# Patient Record
Sex: MALE | Race: ASIAN | NOT HISPANIC OR LATINO
[De-identification: names, ages, dates, MRNs, and addresses within clinical notes are randomized per-mention and may not be internally consistent; named-entity substitution may affect disease eponyms.]

---

## 2019-02-18 ENCOUNTER — OTHER (OUTPATIENT)
Age: 74
End: 2019-02-18

## 2019-02-18 ENCOUNTER — EMERGENCY (EMERGENCY)
Facility: HOSPITAL | Age: 74
LOS: 1 days | Discharge: HOME | End: 2019-02-18
Attending: EMERGENCY MEDICINE
Payer: MEDICARE

## 2019-02-18 VITALS
OXYGEN SATURATION: 99 % | HEART RATE: 74 BPM | TEMPERATURE: 98 F | RESPIRATION RATE: 18 BRPM | SYSTOLIC BLOOD PRESSURE: 128 MMHG | DIASTOLIC BLOOD PRESSURE: 71 MMHG

## 2019-02-18 VITALS
DIASTOLIC BLOOD PRESSURE: 79 MMHG | HEART RATE: 78 BPM | RESPIRATION RATE: 18 BRPM | WEIGHT: 160.06 LBS | SYSTOLIC BLOOD PRESSURE: 135 MMHG | TEMPERATURE: 98 F | OXYGEN SATURATION: 95 % | HEIGHT: 68 IN

## 2019-02-18 PROCEDURE — 72125 CT NECK SPINE W/O DYE: CPT | Mod: 26

## 2019-02-18 PROCEDURE — 70486 CT MAXILLOFACIAL W/O DYE: CPT

## 2019-02-18 PROCEDURE — 76377 3D RENDER W/INTRP POSTPROCES: CPT

## 2019-02-18 PROCEDURE — 90715 TDAP VACCINE 7 YRS/> IM: CPT

## 2019-02-18 PROCEDURE — 99284 EMERGENCY DEPT VISIT MOD MDM: CPT | Mod: 25

## 2019-02-18 PROCEDURE — 72125 CT NECK SPINE W/O DYE: CPT

## 2019-02-18 PROCEDURE — 70450 CT HEAD/BRAIN W/O DYE: CPT | Mod: 26

## 2019-02-18 PROCEDURE — 76377 3D RENDER W/INTRP POSTPROCES: CPT | Mod: 26

## 2019-02-18 PROCEDURE — 93010 ELECTROCARDIOGRAM REPORT: CPT

## 2019-02-18 PROCEDURE — 70486 CT MAXILLOFACIAL W/O DYE: CPT | Mod: 26

## 2019-02-18 PROCEDURE — 93005 ELECTROCARDIOGRAM TRACING: CPT

## 2019-02-18 PROCEDURE — 70450 CT HEAD/BRAIN W/O DYE: CPT

## 2019-02-18 RX ORDER — TETANUS TOXOID, REDUCED DIPHTHERIA TOXOID AND ACELLULAR PERTUSSIS VACCINE, ADSORBED 5; 2.5; 8; 8; 2.5 [IU]/.5ML; [IU]/.5ML; UG/.5ML; UG/.5ML; UG/.5ML
0.5 SUSPENSION INTRAMUSCULAR ONCE
Qty: 0 | Refills: 0 | Status: COMPLETED | OUTPATIENT
Start: 2019-02-18 | End: 2019-02-18

## 2019-02-18 RX ADMIN — TETANUS TOXOID, REDUCED DIPHTHERIA TOXOID AND ACELLULAR PERTUSSIS VACCINE, ADSORBED 0.5 MILLILITER(S): 5; 2.5; 8; 8; 2.5 SUSPENSION INTRAMUSCULAR at 21:24

## 2019-02-18 NOTE — ED PROVIDER NOTE - NSFOLLOWUPINSTRUCTIONS_ED_ALL_ED_FT
1. Take tylenol as needed for pain   2. Apply ice to your eye to decrease swelling   3. Follow-up with ophthalmology this week for reevaluation and continued treatment   4. REturn to the ER for any new or worsening symptoms

## 2019-02-18 NOTE — ED PROVIDER NOTE - NSFOLLOWUPCLINICS_GEN_ALL_ED_FT
[facility] - Ophthalmology  Ophthalmology  [address] [facility] [facility], [facility] [address]  [place] [address]  Phone: [phone]  Fax:   Follow Up Time:

## 2019-02-18 NOTE — ED PROVIDER NOTE - PROGRESS NOTE DETAILS
ophthalmology sees and evaluates patient stating that no entrapment is found, patient's eye is OK and retrobulbar is nonconcerning. no further treatment needed at this time. recommending ophthalmology follow-up this week for reevaluation. -Aure Ruffin PA-C

## 2019-02-18 NOTE — ED ADULT TRIAGE NOTE - CHIEF COMPLAINT QUOTE
vasovagal  + Fall + Loc + Head injury   facial lacerations are sutured by his friend plastic surgery  Pt came for further eval C/O head neck pain + Right eye pain  + nausea + dizziness

## 2019-02-18 NOTE — ED PROVIDER NOTE - OBJECTIVE STATEMENT
73yom NO pmhx here with facial trauma and head injury. Pt reports around 10am straining to have BM and after standing up quickly, fell and +LOC. pt was then seen by plastic surgeon (friend) who sutured the lacerations on the face then spoke to ophtho friend who was concerned with pain to ROM so sent to the ER. no vision changes. no headache. improving nausea since the fall. No vomiting. No chest or abd pain. 73yom NO pmhx here with facial trauma and head injury. Pt reports around 10am straining to have BM and after standing up quickly, fell and +LOC. pt was then seen by plastic surgeon (friend) who sutured the lacerations on the face then spoke to opho friend who was concerned with pain to ROM so sent to the ER. no vision changes. no headache. improving nausea since the fall. No vomiting. No chest or abd pain.    Attendinyo male presents with face trauma s/p fall this morning.  no LOC.  pt had lacerations repaired by a friend of his who is a plastic surgeon.

## 2019-02-18 NOTE — ED ADULT NURSE NOTE - ED STAT RN HANDOFF DETAILS
report received from Bernadette WEISS. pt currently in x ray, resting in bed comfortably report received from Bernadette WEISS. pt currently in CT, resting in bed comfortably

## 2019-02-18 NOTE — ED ADULT NURSE NOTE - OBJECTIVE STATEMENT
73 yr old male top ED c/o s/p syncope in THE BR , while having a BM. lac to rt side face near eye. Saw plastic surgeon friend, had about 20 stitches Steri strips on. Pt awake alert and orientedx3 Resp even and nonlab . Denies chest pain or sob. C/o feeling nausea. C/o rt eyeball hurting. Denies change in vision No facial droop or slurred speech Moving all four extremities. Responds approp to verbal and tactile stimuli. Denies abd pain Wife at bedside

## 2019-02-18 NOTE — CONSULT NOTE ADULT - SUBJECTIVE AND OBJECTIVE BOX
[facility] Ophthalmology Consult Note    HPI: 73yom NO pmhx here with facial trauma and head injury. Pt reports around 10am straining to have BM and after standing up quickly, fell and +LOC. pt was then seen by plastic surgeon (friend) who sutured the lacerations on the face then spoke to ophtho friend who was concerned with pain to EOM so sent to the ER. no vision changes/flashers, floaters. R inf orbital wall fx on CT. no headache. improving nausea since the fall. No vomiting. No chest or abd pain.    PMH: None  Meds: None  POcHx (including surgeries/lasers/trauma):  None  Drops: None  FamHx: None  Social Hx: None  Allergies: NKDA    ROS:  General (neg), Vision (per HPI), Head and Neck (neg), Pulm (neg), CV (neg), GI (neg),  (neg), Musculoskeletal (neg), Skin/Integ (neg), Neuro (neg), Endocrine (neg), Heme (neg), All/Immuno (neg)    Mood and Affect Appropriate ( x ),  Oriented to Time, Place, and Person x 3 ( x )    Ophthalmology Exam    Visual acuity (sc): 20/40 OU  Pupils: PERRL OU, no APD  Ttono: 15 OU  Extraocular movements (EOMs): Full OU, pain w EOM in all gazes, no diplopia   Confrontational Visual Field (CVF):  Full OU  Color Plates: 12/12 OU    Pen Light Exam (PLE)  External:  Repaired per-orbital lacerations OD  Lids/Lashes/Lacrimal Ducts: Flat OU    Sclera/Conjunctiva:  W+Q OU  Cornea: Cl OU  Anterior Chamber: D+F OU  Iris:  Flat OU  Lens:  Cl OU    Fundus Exam: dilated with 1% tropicamide and 2.5% phenylephrine  Approval obtained from primary team for dilation  Patient aware that pupils can remained dilated for at least 4-6 hours  Exam performed with 20D lens    Vitreous: wnl OU  Disc, cup/disc: sharp and pink, 0.4 OU  Macula:  wnl OU  Vessels:  wnl OU  Periphery: wnl OU    Diagnostic Testing:  < from: CT Maxillofacial No Cont (02.18.19 @ 20:11) >  FINDINGS:     CT BRAIN:     There is no acute intra-axial or extra-axial hemorrhage. There is no mass   effect or shift of the midline. The basal cisterns are not effaced. There   is mild cerebral volume loss with commensurate prominence of the   ventricles and sulci. Minimal chronic ischemic changes are seen in the   frontoparietal white matter. There is no CT evidence of an acute vascular  territorial infarct. There are atherosclerotic calcifications of the   intracranial carotid arteries.    There is no significant scalp soft tissue swelling or scalp hematoma. The   skull base and bony calvarium are intact. The tympanic and mastoid   cavities are well aerated.    CT CERVICAL SPINE:     There is mild nonspecific straightening of the cervical spine lordosis.   There is no acute cervical spine fracture or evidence of traumatic   malalignment. There is no significant prevertebral soft tissue   swelling/hematoma. There is minimal multilevel intervertebral disc space   narrowing, small disc bulges, and mild facet and uncinate hypertrophy.   There is no high-grade spinal canal stenosis. There is multilevel varying   degrees of neural foraminal stenosis. The regional soft tissues of the   neck are otherwise grossly unremarkable. The lung apices are clear.    CT FACE    There is no significant facial soft tissue swelling/hematoma. There is a   posterior comminuted right inferior orbital wall fracture with depression   of fracture fragments and large amount of extraconal fat herniating   through the fracture defect. The inferior rectus muscle remains above the   fracture defect. The orbital rim is intact. No other acute maxillofacial   bone fracture is identified. The mandible is intact. The   temporomandibular joints are not dislocated. Incidental note is made of   torus mandibularis. There is a trace hemorrhagic fluid level within the   right maxillary sinus. There is mild bilateral frontoethmoidal recess and   ethmoid mucosal thickening. The optic globes are smooth and symmetric.   There is trace infiltration of the retrobulbar fat on the right likely   representing trace retrobulbar hematoma.      IMPRESSION:     CT BRAIN: No acute intercranial hemorrhage, mass effect, or acute osseous   fracture.    CT CERVICAL SPINE: No acute cervical spine fracture or evidence of   traumatic malalignment. Mild cervical spondylosis.    CT FACE: Posterior comminuted right inferior orbital wall fracture with   depression of fracture fragments and large amount of herniated extraconal   fat through the fracture defect. Inferior rectus muscle remains above the   fracture defect. Small amount of retrobulbar hematoma.    < end of copied text >        Assessment/Plan:  73yom NO pmhx here with facial trauma and head injury. Pt reports around 10am straining to have BM and after standing up quickly, fell and +LOC. pt was then seen by plastic surgeon (friend) who sutured the lacerations on the face then spoke to ophtho friend who was concerned with pain to EOM so sent to the ER. no vision changes/flashers, floaters. no headache. improving nausea since the fall. No vomiting. No chest or abd pain.  - Visual acuity stable   - IOP wnl OU  - EOM full, no diplopia, pain on EOM all gazes  - No radiologic or clinical signs of entrapment  - No enophthalmos   - Globes intact OU  - Avoid nose blowing  - ice packs q1-2 hours for 20 min each time  - abx per primary  - Patient should follow up his/her ophthalmologist or in the [facility] within 1 week of discharge.  [address] [place] [address] [phone]

## 2019-02-18 NOTE — ED PROVIDER NOTE - CARE PROVIDER_API CALL
Bradley Lora)  Ophthalmology  [address] [place] [address], [facility] [address]  [place] [address]  Phone: [phone]  Fax: [phone]  Follow Up Time:

## 2019-03-01 ENCOUNTER — TRANSCRIPTION ENCOUNTER (OUTPATIENT)
Age: 74
End: 2019-03-01

## 2019-03-01 ENCOUNTER — APPOINTMENT (OUTPATIENT)
Dept: OTOLARYNGOLOGY | Facility: CLINIC | Age: 74
End: 2019-03-01
Payer: MEDICARE

## 2019-03-01 ENCOUNTER — APPOINTMENT (OUTPATIENT)
Dept: CARDIOLOGY | Facility: CLINIC | Age: 74
End: 2019-03-01
Payer: MEDICARE

## 2019-03-01 ENCOUNTER — NON-APPOINTMENT (OUTPATIENT)
Age: 74
End: 2019-03-01

## 2019-03-01 VITALS
HEART RATE: 78 BPM | TEMPERATURE: 98.5 F | HEIGHT: 68.11 IN | BODY MASS INDEX: 24.25 KG/M2 | DIASTOLIC BLOOD PRESSURE: 74 MMHG | OXYGEN SATURATION: 96 % | WEIGHT: 160 LBS | SYSTOLIC BLOOD PRESSURE: 138 MMHG | RESPIRATION RATE: 17 BRPM

## 2019-03-01 PROCEDURE — 99204 OFFICE O/P NEW MOD 45 MIN: CPT

## 2019-03-01 PROCEDURE — 93306 TTE W/DOPPLER COMPLETE: CPT

## 2019-03-01 PROCEDURE — 92524 BEHAVRAL QUALIT ANALYS VOICE: CPT | Mod: GN

## 2019-03-01 PROCEDURE — 31579 LARYNGOSCOPY TELESCOPIC: CPT

## 2019-03-01 PROCEDURE — 93224 XTRNL ECG REC UP TO 48 HRS: CPT

## 2019-03-01 PROCEDURE — 92507 TX SP LANG VOICE COMM INDIV: CPT

## 2019-03-01 PROCEDURE — 93000 ELECTROCARDIOGRAM COMPLETE: CPT | Mod: 59

## 2019-03-02 NOTE — HISTORY OF PRESENT ILLNESS
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 Patient reports that on 2/18/19 he had a syncopal episode after using the bathroom when he fainted and hit the floor face down. He was found to have an inferior orbital fracture in the hospital. Patient denies CP, SOB or palpitations. Patient reports that he went to see ENT for hoarseness for 8 weeks and was found to have edema below his VC. He underwent endoscopy and was found to have minimal gastritis without GERD. He was taking PPI as prescribed for 3 weeks which may have made him constipated. 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  Patient is still feeling a little dizzy and he has LE edema on his R leg. Patient had CT angiogram which showed some plaque and a calcium score of 0. Patient is an avid ballroom dancer and had underwent PRP for degenerative knee joint. I advised patient to wear a Holter monitor. I advised patient to undergo an echocardiogram.

## 2019-03-02 NOTE — PHYSICAL EXAM
[General Appearance - Well Developed] : well developed [Normal Appearance] : normal appearance [Well Groomed] : well groomed [General Appearance - Well Nourished] : well nourished [No Deformities] : no deformities [General Appearance - In No Acute Distress] : no acute distress [Normal Conjunctiva] : the conjunctiva exhibited no abnormalities [Eyelids - No Xanthelasma] : the eyelids demonstrated no xanthelasmas [Normal Oral Mucosa] : normal oral mucosa [No Oral Pallor] : no oral pallor [No Oral Cyanosis] : no oral cyanosis [Normal Jugular Venous A Waves Present] : normal jugular venous A waves present [Normal Jugular Venous V Waves Present] : normal jugular venous V waves present [No Jugular Venous Ford A Waves] : no jugular venous ford A waves [Heart Rate And Rhythm] : heart rate and rhythm were normal [Heart Sounds] : normal S1 and S2 [Murmurs] : no murmurs present [Respiration, Rhythm And Depth] : normal respiratory rhythm and effort [Exaggerated Use Of Accessory Muscles For Inspiration] : no accessory muscle use [Auscultation Breath Sounds / Voice Sounds] : lungs were clear to auscultation bilaterally [Abdomen Soft] : soft [Abdomen Tenderness] : non-tender [Abdomen Mass (___ Cm)] : no abdominal mass palpated [Abnormal Walk] : normal gait [Gait - Sufficient For Exercise Testing] : the gait was sufficient for exercise testing [Nail Clubbing] : no clubbing of the fingernails [Cyanosis, Localized] : no localized cyanosis [Petechial Hemorrhages (___cm)] : no petechial hemorrhages [] : no ischemic changes [Oriented To Time, Place, And Person] : oriented to person, place, and time [Affect] : the affect was normal [Mood] : the mood was normal [No Anxiety] : not feeling anxious

## 2019-03-08 ENCOUNTER — NON-APPOINTMENT (OUTPATIENT)
Age: 74
End: 2019-03-08

## 2019-03-09 ENCOUNTER — RESULT REVIEW (OUTPATIENT)
Age: 74
End: 2019-03-09

## 2019-03-15 ENCOUNTER — APPOINTMENT (OUTPATIENT)
Dept: OTOLARYNGOLOGY | Facility: CLINIC | Age: 74
End: 2019-03-15

## 2019-03-28 ENCOUNTER — APPOINTMENT (OUTPATIENT)
Dept: OTOLARYNGOLOGY | Facility: CLINIC | Age: 74
End: 2019-03-28
Payer: COMMERCIAL

## 2019-03-28 PROCEDURE — 92507 TX SP LANG VOICE COMM INDIV: CPT | Mod: GN

## 2019-04-16 ENCOUNTER — APPOINTMENT (OUTPATIENT)
Dept: OTOLARYNGOLOGY | Facility: CLINIC | Age: 74
End: 2019-04-16
Payer: MEDICARE

## 2019-04-16 PROCEDURE — 92507 TX SP LANG VOICE COMM INDIV: CPT | Mod: GN

## 2019-04-30 ENCOUNTER — APPOINTMENT (OUTPATIENT)
Dept: OTOLARYNGOLOGY | Facility: CLINIC | Age: 74
End: 2019-04-30
Payer: MEDICARE

## 2019-04-30 PROCEDURE — 92507 TX SP LANG VOICE COMM INDIV: CPT | Mod: GN

## 2019-05-21 ENCOUNTER — APPOINTMENT (OUTPATIENT)
Dept: OTOLARYNGOLOGY | Facility: CLINIC | Age: 74
End: 2019-05-21
Payer: MEDICARE

## 2019-05-21 PROCEDURE — 92507 TX SP LANG VOICE COMM INDIV: CPT | Mod: GN

## 2019-06-11 ENCOUNTER — APPOINTMENT (OUTPATIENT)
Dept: OTOLARYNGOLOGY | Facility: CLINIC | Age: 74
End: 2019-06-11
Payer: MEDICARE

## 2019-06-11 PROCEDURE — 31579 LARYNGOSCOPY TELESCOPIC: CPT

## 2019-06-11 PROCEDURE — 92524 BEHAVRAL QUALIT ANALYS VOICE: CPT | Mod: GN

## 2019-06-24 ENCOUNTER — OTHER (OUTPATIENT)
Age: 74
End: 2019-06-24

## 2021-11-08 NOTE — ED ADULT TRIAGE NOTE - BP NONINVASIVE SYSTOLIC (MM HG)
135 Peng Advancement Flap Text: The defect edges were debeveled with a #15 scalpel blade.  Given the location of the defect, shape of the defect and the proximity to free margins a Peng advancement flap was deemed most appropriate.  Using a sterile surgical marker, an appropriate advancement flap was drawn incorporating the defect and placing the expected incisions within the relaxed skin tension lines where possible. The area thus outlined was incised deep to adipose tissue with a #15 scalpel blade.  The skin margins were undermined to an appropriate distance in all directions utilizing iris scissors.

## 2022-01-27 ENCOUNTER — APPOINTMENT (OUTPATIENT)
Dept: UROLOGY | Facility: CLINIC | Age: 77
End: 2022-01-27
Payer: MEDICARE

## 2022-01-27 DIAGNOSIS — Z00.00 ENCOUNTER FOR GENERAL ADULT MEDICAL EXAMINATION W/OUT ABNORMAL FINDINGS: ICD-10-CM

## 2022-01-27 DIAGNOSIS — Z78.9 OTHER SPECIFIED HEALTH STATUS: ICD-10-CM

## 2022-01-27 DIAGNOSIS — N40.1 BENIGN PROSTATIC HYPERPLASIA WITH LOWER URINARY TRACT SYMPMS: ICD-10-CM

## 2022-01-27 DIAGNOSIS — N13.8 BENIGN PROSTATIC HYPERPLASIA WITH LOWER URINARY TRACT SYMPMS: ICD-10-CM

## 2022-01-27 DIAGNOSIS — R55 SYNCOPE AND COLLAPSE: ICD-10-CM

## 2022-01-27 PROCEDURE — 99204 OFFICE O/P NEW MOD 45 MIN: CPT

## 2022-01-27 RX ORDER — TAMSULOSIN HYDROCHLORIDE 0.4 MG/1
0.4 CAPSULE ORAL
Qty: 30 | Refills: 3 | Status: ACTIVE | COMMUNITY
Start: 2022-01-27 | End: 1900-01-01

## 2022-01-27 NOTE — LETTER BODY
[FreeTextEntry1] : Ash Good MD \par [address] [facility], [address], \[place] [address]\par [phone]\par \par Dear Dr. Good, \par \par Reason for Visit: BPH. Microscopic hematuria. 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  Patient is here today for evaluation. Patient reports he has weak uroflow, frequency, and hesitancy. He denies any dysuria or urinary incontinence. His symptoms are aggravated by hydration. He denies any alleviating factors. He has not  tried any medical therapy previously. He reports no pain.  His urinalysis demonstrated evidence of microscopic hematuria, 6-10 RBC/HPF. His recent renal ultrasound demonstrated a 7 mm right renal cyst but otherwise unremarkable. All other review of systems are negative. He has no cancer in his family medical history. He has no previous surgical history. Past medical history, family history and social history were inquired and were noncontributory to current condition. The patient does not use tobacco. He drinks alcohol occasionally. Medications and allergies were reviewed. He has no known allergies to medication. \par \par On examination, the patient is an older-appearing gentleman in no acute distress. He is alert and oriented follows commands. He has normal mood and affect. He is normocephalic. Neck is supple. Oral no thrush Respirations are unlabored. His abdomen is soft and nontender. Bladder is nonpalpable. No CVA tenderness. Neurologically he is grossly intact. No peripheral edema. Skin without gross abnormality. He has normal male external genitalia. Normal meatus. Bilateral testes are descended intrascrotally and normal to palpation. On rectal examination, there is normal sphincter tone. The prostate is clinically benign without focal induration or nodularity.\par \par His BMP demonstrated normal renal functions, creatinine 0.89. His PSA was 2.21, which is within normal limits. His urinalysis demonstrated evidence of microscopic hematuria, 6-10 RBC/HPF. \par \par I personally reviewed ultrasound images with the patient today and images demonstrated a 7 mm right renal cyst. There is no evidence of any renal calculi or hydronephrosis. \par \par ASSESSMENT: BPH. Microscopic hematuria. \par \par I counseled the patient. In terms of his BPH, I discussed the natural history of BPH and the treatment options available. I discussed the options of conservative management with fluid in dietary restrictions, herbal therapy, medical therapy, and minimally invasive procedures. I recommended he try Flomax. I discussed the potential side effects of the medication. I counseled the patient on its use and side effects. If the patient develops any side effects, the patient will discontinue the medication and contact me. In terms of his microscopic hematuria, I recommended the patient undergo cystoscopy to evaluate for hematuria. I counseled the patient regarding the procedure. I also recommended the patient repeat urinalysis and obtain urine cytology. Risks and alternatives were discussed. I answered the patient questions. The patient will follow-up as directed and will contact me with any questions or concerns. Thank you for the opportunity to participate in the care of Mr. DAVID. I will keep you updated on his progress.\par \par Plan: Trial of Flomax. Cystoscopy. Urinalysis. Urine cytology.  Follow up in 1 month.

## 2022-01-27 NOTE — ADDENDUM
[FreeTextEntry1] : Entered by Harika Alexandra, acting as scribe for Dr. Se Whiting.\par \par The documentation recorded by the scribe accurately reflects the service I personally performed and the decisions made by me.

## 2022-01-29 PROBLEM — N40.1 PROSTATIC HYPERPLASIA, BENIGN LOCALIZED, WITH OBSTRUCTION: Status: ACTIVE | Noted: 2022-01-27

## 2022-02-02 LAB
APPEARANCE: ABNORMAL
BACTERIA: NEGATIVE
BILIRUBIN URINE: NEGATIVE
BLOOD URINE: NEGATIVE
CALCIUM OXALATE CRYSTALS: ABNORMAL
COLOR: YELLOW
GLUCOSE QUALITATIVE U: NEGATIVE
HYALINE CASTS: 0 /LPF
KETONES URINE: NEGATIVE
LEUKOCYTE ESTERASE URINE: NEGATIVE
MICROSCOPIC-UA: NORMAL
NITRITE URINE: NEGATIVE
PH URINE: 7
PROTEIN URINE: NORMAL
RED BLOOD CELLS URINE: 5 /HPF
SPECIFIC GRAVITY URINE: 1.03
SQUAMOUS EPITHELIAL CELLS: 0 /HPF
URINE COMMENTS: NORMAL
URINE CYTOLOGY: NORMAL
UROBILINOGEN URINE: NORMAL
WHITE BLOOD CELLS URINE: 0 /HPF

## 2022-02-03 ENCOUNTER — OUTPATIENT (OUTPATIENT)
Dept: OUTPATIENT SERVICES | Facility: HOSPITAL | Age: 77
LOS: 1 days | End: 2022-02-03
Payer: MEDICARE

## 2022-02-03 ENCOUNTER — APPOINTMENT (OUTPATIENT)
Dept: UROLOGY | Facility: CLINIC | Age: 77
End: 2022-02-03
Payer: MEDICARE

## 2022-02-03 VITALS
BODY MASS INDEX: 24.25 KG/M2 | HEIGHT: 68 IN | DIASTOLIC BLOOD PRESSURE: 80 MMHG | WEIGHT: 160 LBS | TEMPERATURE: 97.6 F | RESPIRATION RATE: 17 BRPM | OXYGEN SATURATION: 99 % | SYSTOLIC BLOOD PRESSURE: 136 MMHG | HEART RATE: 82 BPM

## 2022-02-03 DIAGNOSIS — Z71.82 EXERCISE COUNSELING: ICD-10-CM

## 2022-02-03 DIAGNOSIS — R35.0 FREQUENCY OF MICTURITION: ICD-10-CM

## 2022-02-03 DIAGNOSIS — R31.21 ASYMPTOMATIC MICROSCOPIC HEMATURIA: ICD-10-CM

## 2022-02-03 PROCEDURE — 88112 CYTOPATH CELL ENHANCE TECH: CPT | Mod: 26

## 2022-02-03 PROCEDURE — 52000 CYSTOURETHROSCOPY: CPT

## 2022-02-05 LAB — URINE CYTOLOGY: NORMAL

## 2022-02-07 DIAGNOSIS — R31.21 ASYMPTOMATIC MICROSCOPIC HEMATURIA: ICD-10-CM

## 2022-02-07 DIAGNOSIS — Z71.82 EXERCISE COUNSELING: ICD-10-CM

## 2024-03-10 PROBLEM — Z71.82 EXERCISE COUNSELING: Status: ACTIVE | Noted: 2019-06-24

## 2024-04-24 NOTE — ED PROVIDER NOTE - TRANSFER CONSULTATION #1
HPI    Pt here for retinal eval per Dr Madden. No changes since last visit, vision   stable.   No eye pain, no using any gtts.   Denies floaters/flashes.   Last edited by Glenys Oliva on 4/24/2024  2:23 PM.          A/P    ICD-10-CM ICD-9-CM   1. Branch retinal vein occlusion of right eye with macular edema  H34.8310 362.36     362.83   2. Stable branch retinal vein occlusion of left eye  H34.8322 362.36   3. Hypertensive retinopathy of both eyes  H35.033 362.11   4. Pseudophakia of both eyes  Z96.1 V43.1   5. Open angle with borderline findings of both eyes  H40.013 365.01       1. Branch retinal vein occlusion of right eye with macular edema  Referral from Dr. Madden for retina eval - Recent notes reviewed  ?Hx of AMD in past    No CNVM or drusenoid findings noted  Exam moreso notable for small tertiary BRVO with tr IRF  Plan: counseled on relation of RVO with HTN and aging, monitor for now, will recheck small amount of IRF in 6 mo     Visit today is associated with current or anticipated ongoing medical care related to this patients single serious condition/complex condition (branch retinal vein occlusion bilateral)     2. Stable branch retinal vein occlusion of left eye  Small tertiary RVO, mild HTN findings, no CME  Plan: Observation for now    3. Hypertensive retinopathy of both eyes  PCP Gerardo Crandall MD - Recent notes reviewed   Mod HTN changes with small tertiary RVOs  Plan: Observation  Recommend good blood pressure control, tight blood glucose control, and good cholesterol control     4. Pseudophakia of both eyes  Good lens position OU  Plan: Observation, update Mrx prn     5. Open angle with borderline findings of both eyes  Saw Dr Madden  IOP 15/17  Plan: has appt with [facility]aze coming up for eval     RTC 6 mo DFE/OCTm OU         I saw and examined the patient and reviewed in detail the findings documented. The final examination findings, image interpretations which have been independently interpreted, and  plan as documented in the record represent my personal judgment and conclusions.    Darion Marti MD  Vitreoretinal Surgery   [facility]    will see patient in ED